# Patient Record
Sex: MALE | Race: WHITE | URBAN - METROPOLITAN AREA
[De-identification: names, ages, dates, MRNs, and addresses within clinical notes are randomized per-mention and may not be internally consistent; named-entity substitution may affect disease eponyms.]

---

## 2022-10-25 ENCOUNTER — OFFICE VISIT (OUTPATIENT)
Dept: OTOLARYNGOLOGY | Facility: CLINIC | Age: 57
End: 2022-10-25
Payer: MEDICAID

## 2022-10-25 ENCOUNTER — OFFICE VISIT (OUTPATIENT)
Dept: AUDIOLOGY | Facility: CLINIC | Age: 57
End: 2022-10-25
Payer: MEDICAID

## 2022-10-25 DIAGNOSIS — H93.19 TINNITUS, UNSPECIFIED LATERALITY: Primary | ICD-10-CM

## 2022-10-25 DIAGNOSIS — H93.13 TINNITUS OF BOTH EARS: Primary | ICD-10-CM

## 2022-10-25 PROCEDURE — 92557 COMPREHENSIVE HEARING TEST: CPT | Performed by: AUDIOLOGIST

## 2022-10-25 PROCEDURE — 99203 OFFICE O/P NEW LOW 30 MIN: CPT | Performed by: OTOLARYNGOLOGY

## 2022-10-25 PROCEDURE — 92567 TYMPANOMETRY: CPT | Performed by: AUDIOLOGIST

## 2022-10-25 NOTE — PROGRESS NOTES
Darvin Moore is a 65year old male. Patient presents with:  Ear Problem: Ringing in ears      HISTORY OF PRESENT ILLNESS  Presents with a 2-year history of worsening ringing in his ears. States that he tested the tongue that he hears and he felt that it was around 5000 Hz purchased some OTC vitamin B12 supplements and after using them for few months noted that his ringing is now around 10,600 Hz. Very bothered by this primarily at night when he is trying to sleep worsens in the mornings at times. Believes he has obstructive sleep apnea and states he does not sleep very well. Has questions regarding the use of these OTC vitamins as well as general questions about tinnitus and hearing loss. Audiogram performed today was reviewed with patient in the office and this does demonstrate normal hearing at the mid and lower frequencies with a slight asymmetry of about 10 dB in the left ear at about 2000 Hz with normal downsloping to moderate sensorineural hearing loss at the highest frequencies which is symmetric. Normal speech discrimination scores and tympanograms. Very bothered by this tinnitus no other signs, symptoms or complaints      Social History    Socioeconomic History      Marital status:       History reviewed. No pertinent family history. History reviewed. No pertinent past medical history. History reviewed. No pertinent surgical history. REVIEW OF SYSTEMS    System Neg/Pos Details   Constitutional Negative Fatigue, fever and weight loss. ENMT Negative Drooling. Eyes Negative Blurred vision and vision changes. Respiratory Negative Dyspnea and wheezing. Cardio Negative Chest pain, irregular heartbeat/palpitations and syncope. GI Negative Abdominal pain and diarrhea. Endocrine Negative Cold intolerance and heat intolerance. Neuro Negative Tremors. Psych Negative Anxiety and depression. Integumentary Negative Frequent skin infections, pigment change and rash. Hema/Lymph Negative Easy bleeding and easy bruising. PHYSICAL EXAM    There were no vitals taken for this visit. Constitutional Normal Overall appearance - Normal.   Psychiatric Normal Orientation - Oriented to time, place, person & situation. Appropriate mood and affect. Neck Exam Normal Inspection - Normal. Palpation - Normal. Parotid gland - Normal. Thyroid gland - Normal.   Eyes Normal Conjunctiva - Right: Normal, Left: Normal. Pupil - Right: Normal, Left: Normal. Fundus - Right: Normal, Left: Normal.   Neurological Normal Memory - Normal. Cranial nerves - Cranial nerves II through XII grossly intact. Head/Face Normal Facial features - Normal. Eyebrows - Normal. Skull - Normal.        Nasopharynx Normal External nose - Normal. Lips/teeth/gums - Normal. Tonsils - Normal. Oropharynx - Normal.   Ears Normal Inspection - Right: Normal, Left: Normal. Canal - Right: Normal, Left: Normal. TM - Right: Normal, Left: Normal.   Skin Normal Inspection - Normal.        Lymph Detail Normal Submental. Submandibular. Anterior cervical. Posterior cervical. Supraclavicular. Nose/Mouth/Throat Normal External nose - Normal. Lips/teeth/gums - Normal. Tonsils - Normal. Oropharynx - Normal.   Nose/Mouth/Throat Normal Nares - Right: Normal Left: Normal. Septum -Normal  Turbinates - Right: Normal, Left: Normal.     No current outpatient medications on file. ASSESSMENT AND PLAN    1. Tinnitus of both ears  Bilateral tinnitus. Frequencies change from about 5000 Hz to about 10,600 Hz. This happened after using vitamin B 12 supplements. He asked whether or not continuing the use of vitamins will make him more sensitive to higher frequency tinnitus which he would not be able to hear. I stated that I am not sure whether that would help in any way but there is no problems with him continuing his use of vitamin B-12 supplements.   We did discuss alternatives such as tinnitus retraining tongue generators as well as masking techniques. I did give him the name of the [email or web address] as a source of information regarding tinnitus. We did discuss managing stress anxiety and dietary issues to prevent worsening of tinnitus when he awakens in the mornings. He does have obstructive sleep apnea and perhaps may benefit from CPAP and he will communicate this with his primary care physician. Return to see me in 1 year with repeat audiogram  - OP REFERRAL TO AUDIOLOGY        This note was prepared using [employer] voice recognition dictation software. As a result errors may occur. When identified these errors have been corrected. While every attempt is made to correct errors during dictation discrepancies may still exist    Praneeth Kuo MD    10/25/2022    12:59 PM